# Patient Record
Sex: FEMALE | Race: WHITE | NOT HISPANIC OR LATINO | Employment: UNEMPLOYED | URBAN - METROPOLITAN AREA
[De-identification: names, ages, dates, MRNs, and addresses within clinical notes are randomized per-mention and may not be internally consistent; named-entity substitution may affect disease eponyms.]

---

## 2017-06-22 ENCOUNTER — TELEPHONE (OUTPATIENT)
Dept: GENETICS | Facility: HOSPITAL | Age: 47
End: 2017-06-22

## 2017-08-22 ENCOUNTER — LAB (OUTPATIENT)
Dept: LAB | Facility: HOSPITAL | Age: 47
End: 2017-08-22

## 2017-08-22 DIAGNOSIS — Z80.3 FAMILY HISTORY OF BREAST CANCER: Primary | ICD-10-CM

## 2017-09-19 ENCOUNTER — DOCUMENTATION (OUTPATIENT)
Dept: GENETICS | Facility: HOSPITAL | Age: 47
End: 2017-09-19

## 2020-10-02 RX ORDER — QUETIAPINE FUMARATE 100 MG/1
100 TABLET, FILM COATED ORAL NIGHTLY PRN
COMMUNITY

## 2020-10-02 RX ORDER — DIAZEPAM 5 MG/1
5 TABLET ORAL
COMMUNITY

## 2020-10-02 RX ORDER — DIVALPROEX SODIUM 500 MG/1
500 TABLET, DELAYED RELEASE ORAL 2 TIMES DAILY
COMMUNITY
End: 2022-12-26

## 2020-10-02 RX ORDER — MONTELUKAST SODIUM 4 MG/500MG
4 GRANULE ORAL DAILY
COMMUNITY
End: 2021-04-09 | Stop reason: SDUPTHER

## 2020-11-24 ENCOUNTER — TELEPHONE (OUTPATIENT)
Dept: URGENT CARE | Facility: CLINIC | Age: 50
End: 2020-11-24

## 2020-11-24 DIAGNOSIS — J20.9 ACUTE BRONCHITIS, UNSPECIFIED ORGANISM: Primary | ICD-10-CM

## 2020-11-24 RX ORDER — PREDNISOLONE SODIUM PHOSPHATE 15 MG/5ML
15 SOLUTION ORAL DAILY
Qty: 25 ML | Refills: 0 | Status: SHIPPED | OUTPATIENT
Start: 2020-11-24 | End: 2020-12-14

## 2020-12-14 ENCOUNTER — OFFICE VISIT (OUTPATIENT)
Dept: GASTROENTEROLOGY | Facility: CLINIC | Age: 50
End: 2020-12-14

## 2020-12-14 VITALS — RESPIRATION RATE: 16 BRPM | TEMPERATURE: 96.9 F | BODY MASS INDEX: 24.73 KG/M2 | HEIGHT: 61 IN | WEIGHT: 131 LBS

## 2020-12-14 DIAGNOSIS — Z01.818 PREOP TESTING: Primary | ICD-10-CM

## 2020-12-14 DIAGNOSIS — K59.00 CONSTIPATION, UNSPECIFIED CONSTIPATION TYPE: Chronic | ICD-10-CM

## 2020-12-14 DIAGNOSIS — Z12.11 ENCOUNTER FOR SCREENING FOR MALIGNANT NEOPLASM OF COLON: ICD-10-CM

## 2020-12-14 DIAGNOSIS — R19.4 CHANGE IN BOWEL HABITS: Primary | Chronic | ICD-10-CM

## 2020-12-14 PROCEDURE — 99204 OFFICE O/P NEW MOD 45 MIN: CPT | Performed by: NURSE PRACTITIONER

## 2020-12-14 RX ORDER — MAGNESIUM CARB/ALUMINUM HYDROX 105-160MG
TABLET,CHEWABLE ORAL
Qty: 2 BOTTLE | Refills: 0 | Status: SHIPPED | OUTPATIENT
Start: 2020-12-14 | End: 2021-04-09

## 2020-12-14 RX ORDER — POLYETHYLENE GLYCOL 1450
POWDER (GRAM) MISCELLANEOUS
Qty: 500 G | Refills: 3 | OUTPATIENT
Start: 2020-12-14 | End: 2022-12-26

## 2020-12-14 RX ORDER — POLYETHYLENE GLYCOL 1450
POWDER (GRAM) MISCELLANEOUS
Qty: 238 G | Refills: 0 | Status: SHIPPED | OUTPATIENT
Start: 2020-12-14 | End: 2021-04-09 | Stop reason: SDUPTHER

## 2020-12-14 RX ORDER — BISACODYL 5 MG/1
TABLET, DELAYED RELEASE ORAL
Qty: 4 TABLET | Refills: 0 | OUTPATIENT
Start: 2020-12-14 | End: 2022-12-26

## 2020-12-14 RX ORDER — SODIUM CHLORIDE 9 MG/ML
70 INJECTION, SOLUTION INTRAVENOUS CONTINUOUS PRN
Status: CANCELLED | OUTPATIENT
Start: 2020-12-14

## 2020-12-14 NOTE — PROGRESS NOTES
New Patient Consult      Date: 2020   Patient Name: Aubrie Meneses  MRN: [id number]  : 1970     Primary Care Provider: Megan Dahl MD    Chief Complaint   Patient presents with   • Constipation     History of Present Illness: Aubrie Meneses is a 50 y.o. female who is here today to establish care with Gastroenterology for constipation.     The patient has been having constipation for the past 3-4 months. This is a change in bowel habits. She can go up to 2 weeks without a bowel movement. She has been trying to drink plenty of water and apple juice without much improvement. She had taken 1 packet of Miralax for about 4-5 days, then she had a bowel movement, but the patient's mother does not remember how long she took it. She is currently not taking anything for constipation. No history of rectal bleeding.     Per mother, the patient does not complain of abdominal pain, nausea or reflux. There is no history of difficulty swallowing. The patient has not had colonoscopy or EGD in the past. There is no family history of GI malignancy.    The patient was seen along with her mother.     Subjective      Past Medical History:   Diagnosis Date   • Anxiety    • Mentally challenged    • Mood disorder (CMS/HCC)      History reviewed. No pertinent surgical history.  Family History   Problem Relation Age of Onset   • Colon polyps Mother    • Colon cancer Neg Hx      Social History     Socioeconomic History   • Marital status: Single     Spouse name: Not on file   • Number of children: Not on file   • Years of education: Not on file   • Highest education level: Not on file   Tobacco Use   • Smoking status: Never Smoker   • Smokeless tobacco: Never Used   Substance and Sexual Activity   • Alcohol use: Never     Frequency: Never   • Drug use: Never   • Sexual activity: Defer       Current Outpatient Medications:   •  divalproex (DEPAKOTE) 500 MG DR tablet, Take 500 mg by mouth 2 (Two) Times a Day., Disp: ,  Rfl:   •  montelukast (SINGULAIR) 4 MG pack, Take 4 mg by mouth Daily., Disp: , Rfl:   •  Polyethylene Glycol 3350 (MIRALAX PO), Take  by mouth., Disp: , Rfl:   •  QUEtiapine (SEROquel) 100 MG tablet, Take 100 mg by mouth Every Night., Disp: , Rfl:   •  sertraline (ZOLOFT) 50 MG tablet, Take 50 mg by mouth Daily., Disp: , Rfl:   •  bisacodyl (DULCOLAX) 5 MG EC tablet, Take as directed for colon prep, Disp: 4 tablet, Rfl: 0  •  diazePAM (VALIUM) 5 MG tablet, Take 5 mg by mouth., Disp: , Rfl:   •  magnesium citrate 1.745 GM/30ML solution solution, Use as directed for colonoscopy prep., Disp: 2 bottle, Rfl: 0  •  Polyethylene Glycol powder, Take 1 cap full (17 grams) in 8 oz of noncarbonated beverage and drink once daily, Disp: 500 g, Rfl: 3  •  Polyethylene Glycol powder, Take as directed for colon prep., Disp: 238 g, Rfl: 0    No Known Allergies     Review of Systems   Constitutional: Negative for appetite change and unexpected weight loss.   HENT: Negative for trouble swallowing.    Eyes: Negative for blurred vision.   Respiratory: Negative for choking and chest tightness.    Cardiovascular: Negative for leg swelling.   Gastrointestinal: Positive for constipation. Negative for abdominal distention, abdominal pain, anal bleeding, blood in stool, diarrhea, nausea, rectal pain, vomiting, GERD and indigestion.   Endocrine: Negative for polyphagia.   Genitourinary: Negative for hematuria.   Musculoskeletal: Negative for arthralgias and myalgias.   Skin: Negative for rash.   Allergic/Immunologic: Negative for food allergies.   Neurological: Negative for dizziness and syncope.   Hematological: Does not bruise/bleed easily.   Psychiatric/Behavioral: Negative for depressed mood.      The following portions of the patient's history were reviewed and updated as appropriate: allergies, current medications, past family history, past medical history, past social history, past surgical history and problem list.    Objective      Physical Exam  Vitals signs and nursing note reviewed.   Constitutional:       General: She is awake. She is not in acute distress.     Appearance: Normal appearance. She is well-developed. She is not diaphoretic.   HENT:      Head: Normocephalic and atraumatic.      Right Ear: Hearing and external ear normal.      Left Ear: Hearing and external ear normal.      Nose: Nose normal.      Mouth/Throat:      Lips: Pink.      Mouth: Mucous membranes are not pale, not dry and not cyanotic. No oral lesions.      Pharynx: No oropharyngeal exudate.   Eyes:      General: Lids are normal.         Right eye: No discharge.         Left eye: No discharge.      Conjunctiva/sclera: Conjunctivae normal.   Neck:      Musculoskeletal: Neck supple. No edema.      Thyroid: No thyroid mass or thyromegaly.      Vascular: No JVD.      Trachea: Trachea normal.   Cardiovascular:      Rate and Rhythm: Normal rate and regular rhythm.      Heart sounds: Normal heart sounds and S2 normal. No murmur. No friction rub. No gallop. No S3 sounds.    Pulmonary:      Effort: Pulmonary effort is normal. No respiratory distress.      Breath sounds: Normal breath sounds.   Chest:      Chest wall: No tenderness.   Abdominal:      General: Bowel sounds are normal. There is no distension.      Palpations: Abdomen is not rigid. There is no hepatomegaly, splenomegaly or mass.      Tenderness: There is no abdominal tenderness. There is no guarding or rebound.      Hernia: No hernia is present.   Musculoskeletal: Normal range of motion.   Lymphadenopathy:      Cervical: No cervical adenopathy.      Upper Body:      Left upper body: No supraclavicular adenopathy.   Skin:     General: Skin is warm and dry.      Coloration: Skin is not pale.      Findings: No rash.      Nails: There is no clubbing.     Neurological:      Mental Status: She is alert. Mental status is at baseline.      Coordination: Coordination abnormal.      Gait: Gait abnormal.   Psychiatric:    "      Mood and Affect: Mood is anxious.         Speech: Speech is slurred (difficult to understand at times).         Behavior: Behavior is cooperative.         Cognition and Memory: Cognition is impaired.       Vital Signs:   Vitals:    12/14/20 1424   BP: Comment: pt is mentally challenged, started to cry and fuss   Resp: 16   Temp: 96.9 °F (36.1 °C)   Weight: 59.4 kg (131 lb)   Height: 154.9 cm (61\")     Results Review:   I have reviewed the patient's new clinical and imaging results.    Admission on 11/22/2020, Discharged on 11/22/2020   Component Date Value Ref Range Status   • SARS-CoV-2, REINALDO 11/22/2020 Not Detected  Not Detected Final    Comment: This nucleic acid amplification test was developed and its performance  characteristics determined by [employer]. Nucleic acid  amplification tests include PCR and TMA. This test has not been FDA  cleared or approved. This test has been authorized by FDA under an  Emergency Use Authorization (EUA). This test is only authorized for  the duration of time the declaration that circumstances exist  justifying the authorization of the emergency use of in vitro  diagnostic tests for detection of SARS-CoV-2 virus and/or diagnosis  of COVID-19 infection under section 564(b)(1) of the Act, 21 U.S.C.  360bbb-3(b) (1), unless the authorization is terminated or revoked  sooner.  When diagnostic testing is negative, the possibility of a false  negative result should be considered in the context of a patient's  recent exposures and the presence of clinical signs and symptoms  consistent with COVID-19. An individual without symptoms of COVID-19  and who is not shedding SARS-CoV-2 virus would                            expect to have a  negative (not detected) result in this assay.      No radiology results for the last 90 days.     Dated 8/21/2020 albumin 4.4 total bilirubin 0.3 alkaline phosphatase 73 AST 24 ALT 20 hemoglobin 14.7 hematocrit 45.0 platelet count 326 TSH " 3.040    Assessment / Plan      1. Change in bowel habits  2. Constipation, unspecified constipation type  3. Encounter for screening for malignant neoplasm of colon  The patient has been having constipation for the past few months, this is described as a change in bowel habits. The patient's mother denies history of rectal bleeding. The patient does not complain of abdominal pain. She has taken Miralax in the past with some improvement, but she did not take it daily, only until she had a bowel movement. Recent labs unremarkable, TSH normal. The patient has special needs and is accompanied by her mother.  High fiber, low fat diet with liberal water intake.   Miralax 17 grams daily.  Colonoscopy for screening as she has not had a colonoscopy in the past. There is no family history of colon cancer.    - Case Request; Standing  - Implement Anesthesia Orders Day of Procedure; Standing  - Obtain Informed Consent; Standing  - Verify Bowel Prep Was Successful; Standing  - Oxygen Therapy- Nasal Cannula; 2 LPM; Titrate for SPO2: equal to or greater than, 90%; Standing  - sodium chloride 0.9 % infusion  - Case Request  - Polyethylene Glycol powder; Take 1 cap full (17 grams) in 8 oz of noncarbonated beverage and drink once daily  Dispense: 500 g; Refill: 3  - bisacodyl (DULCOLAX) 5 MG EC tablet; Take as directed for colon prep  Dispense: 4 tablet; Refill: 0  - Polyethylene Glycol powder; Take as directed for colon prep.  Dispense: 238 g; Refill: 0  - magnesium citrate 1.745 GM/30ML solution solution; Use as directed for colonoscopy prep.  Dispense: 2 bottle; Refill: 0    Patient Instructions   1. High fiber, low fat diet with liberal water intake.   2. Miralax 17 grams in 8 ounces of noncarbonated beverage daily.   3. Colonoscopy: Description of the procedure, risks, benefits, alternatives and options, including nonoperative options, were discussed with the patient in detail. The patient's mother understands and wishes to  proceed.  4. COVID-19 testing prior to procedure. The patient will need to self-quarantine after testing until the procedure. Instructions given to patient's mother.    The patient was seen along with her mother.      Zach Aleman, APRN  12/14/2020    Please note that portions of this note may have been completed with a voice recognition program. Efforts were made to edit the dictations, but occasionally words are mistranscribed.

## 2020-12-14 NOTE — PATIENT INSTRUCTIONS
1. High fiber, low fat diet with liberal water intake.   2. Miralax 17 grams in 8 ounces of noncarbonated beverage daily.   3. Colonoscopy: Description of the procedure, risks, benefits, alternatives and options, including nonoperative options, were discussed with the patient in detail. The patient's mother understands and wishes to proceed.  4. COVID-19 testing prior to procedure. The patient will need to self-quarantine after testing until the procedure. Instructions given to patient's mother.    The patient was seen along with her mother.

## 2020-12-15 ENCOUNTER — TELEPHONE (OUTPATIENT)
Dept: GASTROENTEROLOGY | Facility: CLINIC | Age: 50
End: 2020-12-15

## 2020-12-15 NOTE — TELEPHONE ENCOUNTER
Patients Mom has concern because of her daughter is a special needs person, she is asking about a one dose pill that is taken instead of the usual prep-she knows Meryl will not take -, if there is a pill, she will pay out of pocket if needed, but to make sure we get a cost for her. she wants to make sure that \[facility]\"" knows about Meryl, , do not use any trainees on her,

## 2020-12-16 DIAGNOSIS — R19.4 CHANGE IN BOWEL HABITS: Primary | ICD-10-CM

## 2020-12-16 RX ORDER — SODIUM, POTASSIUM,MAG SULFATES 17.5-3.13G
SOLUTION, RECONSTITUTED, ORAL ORAL
Qty: 2 BOTTLE | Refills: 0 | COMMUNITY
Start: 2020-12-16 | End: 2022-12-26

## 2020-12-16 NOTE — TELEPHONE ENCOUNTER
Let her know we are not aware of a one dose pill for colon prep. There is a prep that has 32 pills, but they are very large, and people can have a difficult time with it if they have trouble swallowing large pills. She would take 4 pills with 8 ounces of water every 15 minutes x 4 doses, then depending on time of procedure, it is another 4 pills with 8 ounces of water every 15 minutes x 4. If she thinks she can take this, we would have to get her the instructions.  They are coming out with a new prep in 2021 that is in pill form, it will have several pills to take, but we do not have that information yet as it is not available yet and not sure when it will be. When pre-op calls her a few days before her procedure, let them know about her special needs.

## 2020-12-16 NOTE — TELEPHONE ENCOUNTER
Can you call her mom back and see if she would do the Suprep? It is less to drink and is supposed to taste better..... If she will, her mom can stop by and  a sample. That may be easier for her to tolerate.

## 2021-01-21 ENCOUNTER — LAB (OUTPATIENT)
Dept: LAB | Facility: HOSPITAL | Age: 51
End: 2021-01-21

## 2021-01-21 DIAGNOSIS — Z01.818 PREOP TESTING: ICD-10-CM

## 2021-01-21 PROCEDURE — C9803 HOPD COVID-19 SPEC COLLECT: HCPCS

## 2021-01-21 PROCEDURE — U0004 COV-19 TEST NON-CDC HGH THRU: HCPCS

## 2021-01-22 LAB — SARS-COV-2 RNA RESP QL NAA+PROBE: NOT DETECTED

## 2021-04-09 ENCOUNTER — OFFICE VISIT (OUTPATIENT)
Dept: INTERNAL MEDICINE | Facility: CLINIC | Age: 51
End: 2021-04-09

## 2021-04-09 VITALS
WEIGHT: 138.8 LBS | TEMPERATURE: 97.1 F | BODY MASS INDEX: 26.21 KG/M2 | DIASTOLIC BLOOD PRESSURE: 68 MMHG | HEART RATE: 79 BPM | HEIGHT: 61 IN | OXYGEN SATURATION: 97 % | SYSTOLIC BLOOD PRESSURE: 124 MMHG

## 2021-04-09 DIAGNOSIS — Z12.31 ENCOUNTER FOR SCREENING MAMMOGRAM FOR MALIGNANT NEOPLASM OF BREAST: Primary | ICD-10-CM

## 2021-04-09 DIAGNOSIS — Z12.11 ENCOUNTER FOR SCREENING FOR MALIGNANT NEOPLASM OF COLON: ICD-10-CM

## 2021-04-09 DIAGNOSIS — F39 MOOD DISORDER (HCC): ICD-10-CM

## 2021-04-09 DIAGNOSIS — F81.9 MENTAL DEVELOPMENTAL DELAY: ICD-10-CM

## 2021-04-09 DIAGNOSIS — K59.00 CONSTIPATION, UNSPECIFIED CONSTIPATION TYPE: ICD-10-CM

## 2021-04-09 DIAGNOSIS — F41.9 ANXIETY: ICD-10-CM

## 2021-04-09 DIAGNOSIS — R29.898 WEAKNESS OF BOTH LOWER EXTREMITIES: ICD-10-CM

## 2021-04-09 PROCEDURE — 99214 OFFICE O/P EST MOD 30 MIN: CPT | Performed by: FAMILY MEDICINE

## 2021-04-09 RX ORDER — MONTELUKAST SODIUM 4 MG/500MG
4 GRANULE ORAL DAILY
Qty: 90 EACH | Refills: 3 | OUTPATIENT
Start: 2021-04-09 | End: 2022-12-26

## 2021-04-09 NOTE — PROGRESS NOTES
Aubrie Meneses is a 50 y.o. female.    Chief Complaint   Patient presents with   • Anxiety   • Insomnia       HPI   Patient presents today with mother and stepfather to establish care.  Patient does have mental delay.  Mother reports anxiety and mood is improved with zoloft and depakote.  Mom gives seorquel as needed for sleep. Valium is strictly as needed if she has a procedure, dental exam, blood draw, etc.     Mother reports patient is falling more.  Unstable on feet.  She is weak in her legs. She has done PT in the past.  Mother reports that she was told by previous providers that the patient would never walk or talk.  She is walking, but does require some assistance.  Patient is able to talk and express herself.    Patient does struggle with constipation at times.  This is alleviated with MiraLAX.  She has seen GI for this.  Mother would prefer that the patient do Cologuard rather than a colonoscopy.     The following portions of the patient's history were reviewed and updated as appropriate: allergies, current medications, past family history, past medical history, past social history, past surgical history and problem list.     Past Medical History:   Diagnosis Date   • Abnormal gait    • Anxiety    • At high risk for injury related to fall    • Constipation    • Deficit in communication due to slurred speech    • Dysphagia     tries to eat and drink too fast and aspirates easy   • Mentally challenged    • Mood disorder (CMS/HCC)    • Special needs due to learning disability     mother Virginia assists and is guardian       Past Surgical History:   Procedure Laterality Date   • TOOTH EXTRACTION         Family History   Problem Relation Age of Onset   • Colon polyps Mother    • Colon cancer Neg Hx        Social History     Socioeconomic History   • Marital status: Single     Spouse name: Not on file   • Number of children: Not on file   • Years of education: Not on file   • Highest education level: Not on  file   Tobacco Use   • Smoking status: Never Smoker   • Smokeless tobacco: Never Used   Vaping Use   • Vaping Use: Never used   Substance and Sexual Activity   • Alcohol use: Never   • Drug use: Never   • Sexual activity: Defer       No Known Allergies      Current Outpatient Medications:   •  bisacodyl (DULCOLAX) 5 MG EC tablet, Take as directed for colon prep, Disp: 4 tablet, Rfl: 0  •  diazePAM (VALIUM) 5 MG tablet, Take 5 mg by mouth. Only for dental procedures and checkups, Disp: , Rfl:   •  divalproex (DEPAKOTE) 500 MG DR tablet, Take 500 mg by mouth 2 (Two) Times a Day., Disp: , Rfl:   •  montelukast (SINGULAIR) 4 MG pack, Take 1 packet by mouth Daily., Disp: 90 each, Rfl: 3  •  Polyethylene Glycol powder, Take 1 cap full (17 grams) in 8 oz of noncarbonated beverage and drink once daily, Disp: 500 g, Rfl: 3  •  QUEtiapine (SEROquel) 100 MG tablet, Take 100 mg by mouth At Night As Needed., Disp: , Rfl:   •  sertraline (ZOLOFT) 50 MG tablet, Take 50 mg by mouth Daily., Disp: , Rfl:   •  sodium-potassium-magnesium sulfates (Suprep Bowel Prep Kit) 17.5-3.13-1.6 GM/177ML solution oral solution, Use as directed for colonoscopy prep. Patient has instructions., Disp: 2 bottle, Rfl: 0    ROS    Review of Systems   Constitutional: Negative for chills, fatigue and fever.   HENT: Negative for congestion, postnasal drip and sore throat.    Eyes: Negative for blurred vision and visual disturbance.   Respiratory: Negative for cough, shortness of breath and wheezing.    Cardiovascular: Negative for chest pain and leg swelling.   Gastrointestinal: Positive for constipation. Negative for abdominal pain, diarrhea, nausea and vomiting.   Endocrine: Negative for cold intolerance and heat intolerance.   Genitourinary: Negative for dysuria and frequency.   Musculoskeletal: Positive for gait problem. Negative for arthralgias and back pain.   Skin: Negative for color change and rash.   Allergic/Immunologic: Negative for environmental  "allergies.   Neurological: Positive for weakness. Negative for numbness and headache.   Hematological: Does not bruise/bleed easily.   Psychiatric/Behavioral: Negative for depressed mood. The patient is not nervous/anxious.        Vitals:    04/09/21 0920   BP: 124/68   BP Location: Left arm   Patient Position: Sitting   Cuff Size: Adult   Pulse: 79   Temp: 97.1 °F (36.2 °C)   TempSrc: Temporal   SpO2: 97%   Weight: 63 kg (138 lb 12.8 oz)   Height: 154.9 cm (61\")     Body mass index is 26.23 kg/m².    Physical Exam     Physical Exam  Constitutional:       General: She is not in acute distress.     Appearance: Normal appearance. She is well-developed.   HENT:      Head: Normocephalic and atraumatic.      Right Ear: External ear normal.      Left Ear: External ear normal.   Eyes:      Extraocular Movements: Extraocular movements intact.      Conjunctiva/sclera: Conjunctivae normal.   Cardiovascular:      Rate and Rhythm: Normal rate and regular rhythm.      Heart sounds: No murmur heard.     Pulmonary:      Effort: Pulmonary effort is normal. No respiratory distress.      Breath sounds: Normal breath sounds. No wheezing.   Abdominal:      General: Bowel sounds are normal. There is no distension.      Palpations: Abdomen is soft.      Tenderness: There is no abdominal tenderness.   Musculoskeletal:      Right lower leg: No edema.      Left lower leg: No edema.      Comments: Weakness to lower legs bilaterally.  Patient unable to bear weight without assistance.   Skin:     General: Skin is warm and dry.   Neurological:      Mental Status: She is alert and oriented to person, place, and time.      Cranial Nerves: No cranial nerve deficit.      Motor: Weakness present.      Comments: Mental delay   Psychiatric:         Attention and Perception: Attention normal.         Mood and Affect: Mood normal.         Speech: Speech is delayed.         Behavior: Behavior normal.         Assessment/Plan    Problems Addressed this " Visit        Gastrointestinal Abdominal     Constipation     Stable with current medication.  Patient may continue receiving MiraLAX as needed.            Mental Health    Mood disorder (CMS/HCC)     Stable on current medication.  Patient is to continue Zoloft and Depakote.         Anxiety     Stable on current medication.  Patient will continue Zoloft and Depakote.  May use as needed Valium on rare occasions.            Neuro    Mental developmental delay     Stable.  Patient is verbal and answers questions.  She is able to express herself as previously mentioned.  Patient does require assistance with walking due to severe weakness in her legs.  They have tried physical therapy in the past.  They do not wish to proceed with physical therapy at this point.           Other Visit Diagnoses     Encounter for screening mammogram for malignant neoplasm of breast    -  Primary    Relevant Orders    Cologuard - Stool, Per Rectum    Encounter for screening for malignant neoplasm of colon         Relevant Orders    Cologuard - Stool, Per Rectum    Weakness of both lower extremities              New Medications Ordered This Visit   Medications   • montelukast (SINGULAIR) 4 MG pack     Sig: Take 1 packet by mouth Daily.     Dispense:  90 each     Refill:  3       No orders of the defined types were placed in this encounter.      Return in about 6 months (around 10/9/2021) for Medicare Wellness.      Arpita Olson DO

## 2021-04-25 PROBLEM — F41.9 ANXIETY: Status: ACTIVE | Noted: 2021-04-25

## 2021-04-25 PROBLEM — F39 MOOD DISORDER: Status: ACTIVE | Noted: 2021-04-25

## 2021-04-25 PROBLEM — F81.9 MENTAL DEVELOPMENTAL DELAY: Status: ACTIVE | Noted: 2021-04-25

## 2021-04-25 NOTE — ASSESSMENT & PLAN NOTE
Stable on current medication.  Patient will continue Zoloft and Depakote.  May use as needed Valium on rare occasions.

## 2021-04-25 NOTE — ASSESSMENT & PLAN NOTE
Stable.  Patient is verbal and answers questions.  She is able to express herself as previously mentioned.  Patient does require assistance with walking due to severe weakness in her legs.  They have tried physical therapy in the past.  They do not wish to proceed with physical therapy at this point.

## 2022-04-19 ENCOUNTER — TELEPHONE (OUTPATIENT)
Dept: INTERNAL MEDICINE | Facility: CLINIC | Age: 52
End: 2022-04-19

## 2022-04-19 NOTE — TELEPHONE ENCOUNTER
Pt did not complete cologuard ordered on 04/10/2021. This order is too old to be used and has been cancelled.

## 2023-05-17 ENCOUNTER — HOSPITAL ENCOUNTER (OUTPATIENT)
Dept: GENERAL RADIOLOGY | Facility: HOSPITAL | Age: 53
Discharge: HOME | End: 2023-05-17
Admitting: FAMILY MEDICINE
Payer: MEDICARE

## 2023-05-17 ENCOUNTER — TRANSCRIBE ORDERS (OUTPATIENT)
Dept: GENERAL RADIOLOGY | Facility: HOSPITAL | Age: 53
End: 2023-05-17
Payer: MEDICARE

## 2023-05-17 DIAGNOSIS — M79.642 PAIN IN LEFT HAND: Primary | ICD-10-CM

## 2023-05-17 DIAGNOSIS — M79.642 PAIN IN LEFT HAND: ICD-10-CM

## 2023-05-17 PROCEDURE — 73130 X-RAY EXAM OF HAND: CPT

## 2023-05-18 ENCOUNTER — OFFICE VISIT (OUTPATIENT)
Dept: ORTHOPEDIC SURGERY | Facility: CLINIC | Age: 53
End: 2023-05-18
Payer: MEDICARE

## 2023-05-18 VITALS
HEIGHT: 61 IN | WEIGHT: 140 LBS | DIASTOLIC BLOOD PRESSURE: 80 MMHG | BODY MASS INDEX: 26.43 KG/M2 | SYSTOLIC BLOOD PRESSURE: 124 MMHG | TEMPERATURE: 97.8 F

## 2023-05-18 DIAGNOSIS — S62.355A CLOSED NONDISPLACED FRACTURE OF SHAFT OF FOURTH METACARPAL BONE OF LEFT HAND, INITIAL ENCOUNTER: ICD-10-CM

## 2023-05-18 DIAGNOSIS — F41.9 ANXIOUSNESS: Primary | ICD-10-CM

## 2023-05-18 DIAGNOSIS — M79.642 LEFT HAND PAIN: ICD-10-CM

## 2023-05-18 DIAGNOSIS — S62.323A CLOSED DISPLACED FRACTURE OF SHAFT OF THIRD METACARPAL BONE OF LEFT HAND, INITIAL ENCOUNTER: Primary | ICD-10-CM

## 2023-05-18 RX ORDER — DIAZEPAM 5 MG/1
TABLET ORAL
Qty: 2 TABLET | Refills: 0 | Status: SHIPPED | OUTPATIENT
Start: 2023-05-18

## 2023-05-18 NOTE — PROGRESS NOTES
Office Note     Name: Aubrie Meneses    : 1970     MRN: [id number]     Chief Complaint  Pain and Fracture of the Left Hand (Referred by Megan Dahl MD for left hand fracture. Her mom states she fell coming into the house from the porch, injured left hand on 23. Mom has been placing ice packs on her hand. )    Subjective     History of Present Illness:  Aubrie Meneses is a 52 y.o. female presents with mom today for left hand 3rd and possibly 4th metacarpal fracture that occurred last weekend.  She was seen by Dr. Dahl and diagnosed and then sent here for referral.  Her mother already had a wrist splint at home which the patient has been wearing intermittently.  However mother states that the patient has poor compliance with the splint as she is frequently taking it on and off.    Review of Systems   Constitutional: Negative for fever.   HENT: Negative for dental problem and voice change.    Eyes: Negative for visual disturbance.   Respiratory: Negative for shortness of breath.    Cardiovascular: Negative for chest pain.   Gastrointestinal: Negative for abdominal pain.   Genitourinary: Negative for dysuria.   Musculoskeletal: Positive for arthralgias. Negative for gait problem and joint swelling.   Skin: Positive for color change (bruising left hand). Negative for rash.   Neurological: Negative for speech difficulty.   Hematological: Does not bruise/bleed easily.   Psychiatric/Behavioral: Negative for confusion.        Past Medical History:   Diagnosis Date   • Abnormal gait    • Anxiety    • At high risk for injury related to fall    • Constipation    • Deficit in communication due to slurred speech    • Dysphagia     tries to eat and drink too fast and aspirates easy   • Mentally challenged    • Mood disorder    • Special needs due to learning disability     mother Virginia assists and is guardian        Past Surgical History:   Procedure Laterality Date   • TOOTH EXTRACTION         Family  "History   Problem Relation Age of Onset   • Colon polyps Mother    • Colon cancer Neg Hx        Social History     Socioeconomic History   • Marital status: Single   Tobacco Use   • Smoking status: Never   • Smokeless tobacco: Never   Vaping Use   • Vaping Use: Never used   Substance and Sexual Activity   • Alcohol use: Never   • Drug use: Never   • Sexual activity: Defer         Current Outpatient Medications:   •  diazePAM (VALIUM) 5 MG tablet, Take 1 tablet by mouth. Only for dental procedures and checkups, Disp: , Rfl:   •  QUEtiapine (SEROquel) 100 MG tablet, Take 1 tablet by mouth At Night As Needed., Disp: , Rfl:   •  sertraline (ZOLOFT) 50 MG tablet, Take 1 tablet by mouth Daily., Disp: , Rfl:   •  diazePAM (Valium) 5 MG tablet, Take 2 tablets approximately 20 minutes prior to procedure once for procedural anxiety., Disp: 2 tablet, Rfl: 0    No Known Allergies        Objective   /80   Temp 97.8 °F (36.6 °C)   Ht 154.9 cm (61\")   Wt 63.5 kg (140 lb)   BMI 26.45 kg/m²    BMI is >= 25 and <30. (Overweight) The following options were offered after discussion;: weight loss educational material (shared in after visit summary)       Physical Exam  Left Hand Exam     Tenderness   The patient is experiencing tenderness in the dorsal area.     Range of Motion   The patient has normal left wrist ROM.    Comments:  Left hand:  There is mild bruising over the dorsal area of the left hand as well as mild swelling throughout the dorsum.  She has normal cascade of motion and there is no significant rotational or angular deformity of the third and fourth digits.  Radial and ulnar pulses are 2+, cap refill is brisk to each digit.  Gross sensation appears to be intact.    Exam was somewhat limited due to the patient being agitated and her status as special needs.           Extremity DVT signs are negative by clinical screen. and negative on physical exam with negative Maile sign, no calf pain, no palpable cords and " no skin tone change     Independent Review of Radiographic Studies:    Reviewed relevant prior imaging with patient again today.  Reviewed images and agree with radiologist interpretation.   Closed, minimally displaced fracture of the proximal 3rd metacarpal shaft as well as questionable fracture of the proximal 4th metacarpal shaft.      Procedures    Assessment and Plan   Diagnoses and all orders for this visit:    1. Closed displaced fracture of shaft of third metacarpal bone of left hand, initial encounter (Primary)    2. Left hand pain    3. Closed nondisplaced fracture of shaft of fourth metacarpal bone of left hand, initial encounter       Reference materials relating to condition and treatment provided to the patient  Orthopedic activities reviewed and patient expressed appreciation  Discussion of orthopedic goals  Risk, benefits, and merits of treatment alternatives reviewed with the patient and questions answered  Use brace as instructed  Elevate hand for residual swelling  Ice, heat, and/or modalities as beneficial    Recommendations/Plan:  Exercise, medications, injections, other patient advice, and return appointment as noted.  Patient is encouraged to call or return for any issues or concerns.  Attempted to apply a short arm cast to the patient's left arm however we were unsuccessful due to the patient's agitation and status as a special needs person.  The mother is requesting a 2, 5mg Valium to be sent in to give the patient prior to her procedure so that she is more calm and so the cast can be applied.  I sent a refill request to Dr. Perrin or rivka for approval.  Patient is to wear her splint until her follow-up with me tomorrow when we will attempt to apply the cast.    No follow-ups on file.  Patient agreeable to call or return sooner for any concerns.

## 2023-05-19 ENCOUNTER — DOCUMENTATION (OUTPATIENT)
Dept: ORTHOPEDIC SURGERY | Facility: CLINIC | Age: 53
End: 2023-05-19
Payer: MEDICARE

## 2023-05-19 NOTE — PROGRESS NOTES
"Thumb-o-prene brace was placed on left hand. This was then wrapped with 3\" ACE wrap.  Thumb spica part of the brace was cut by Dr. Oneil to allow thumb movement. All edges of ACE wrap were taped with clear surgical tape to deter removal by patient. Patient tolerated this well, and will follow up at scheduled appointment. If any questions or concerns arise before her appointment, her parents were instructed to call the office.   "

## 2023-10-31 DIAGNOSIS — M79.671 RIGHT FOOT PAIN: Primary | ICD-10-CM

## 2023-11-01 ENCOUNTER — OFFICE VISIT (OUTPATIENT)
Dept: ORTHOPEDIC SURGERY | Facility: CLINIC | Age: 53
End: 2023-11-01
Payer: MEDICARE

## 2023-11-01 VITALS — TEMPERATURE: 98 F | BODY MASS INDEX: 26.43 KG/M2 | WEIGHT: 140 LBS | HEIGHT: 61 IN

## 2023-11-01 DIAGNOSIS — S93.601A FOOT SPRAIN, RIGHT, INITIAL ENCOUNTER: ICD-10-CM

## 2023-11-01 DIAGNOSIS — S92.311A DISPLACED FRACTURE OF FIRST METATARSAL BONE, RIGHT FOOT, INITIAL ENCOUNTER FOR CLOSED FRACTURE: Primary | ICD-10-CM

## 2023-11-01 NOTE — PROGRESS NOTES
Office Note     Name: Aubrie Meneses    : 1970     MRN: [id number]     Chief Complaint  Pain of the Right Foot (Reports falling at home on 10/30/23. )    Subjective     History of Present Illness:  Aubrie Meneses is a 53 y.o. female presenting today for right foot pain and swelling.  She presents with her guardian.  Patient is largely non-verbal and unable to provide history.  According to guardian she fell on 10/30/23 and has been complaining of right foot pain at the base of the 1st metatarsal.  There is also swelling and mild bruising.  Guardian relays that she is having difficulty walking and states that she has pain in her right foot when bearing weight.  Guardian relays history of right foot injury in  that seemingly healed without sequela.      Review of Systems   Musculoskeletal:  Positive for arthralgias (right foot) and joint swelling (right foot).        Past Medical History:   Diagnosis Date    Abnormal gait     Anxiety     At high risk for injury related to fall     Constipation     Deficit in communication due to slurred speech     Dysphagia     tries to eat and drink too fast and aspirates easy    Mentally challenged     Mood disorder     Special needs due to learning disability     mother Virginia assists and is guardian        Past Surgical History:   Procedure Laterality Date    TOOTH EXTRACTION         Family History   Problem Relation Age of Onset    Colon polyps Mother     Colon cancer Neg Hx        Social History     Socioeconomic History    Marital status: Single   Tobacco Use    Smoking status: Never    Smokeless tobacco: Never   Vaping Use    Vaping Use: Never used   Substance and Sexual Activity    Alcohol use: Never    Drug use: Never    Sexual activity: Defer         Current Outpatient Medications:     diazePAM (Valium) 5 MG tablet, Take 2 tablets approximately 20 minutes prior to procedure once for procedural anxiety., Disp: 2 tablet, Rfl: 0    QUEtiapine (SEROquel)  "100 MG tablet, Take 1 tablet by mouth At Night As Needed., Disp: , Rfl:     sertraline (ZOLOFT) 50 MG tablet, Take 1 tablet by mouth Daily., Disp: , Rfl:     No Known Allergies        Objective   Temp 98 °F (36.7 °C)   Ht 155 cm (61.02\")   Wt 63.5 kg (140 lb)   BMI 26.44 kg/m²            Physical Exam  Right Ankle Exam     Tenderness   Right ankle tenderness location: tenderness over lateral and anterior base of 1st metatarsal.  Swelling: mild    Range of Motion   The patient has normal right ankle ROM.    Tests   Anterior drawer: negative  Varus tilt: negative    Other   Erythema: absent  Sensation: normal  Pulse: present     Comments:  Slight bruising noted at dorsal base of toes.             Extremity DVT signs are negative by clinical screen.     Independent Review of Radiographic Studies:    Three-view plain films of the right foot were done in office today for the evaluation of pain, comparison views not available and independently reviewed by me.  There is a tiny avulsion fracture of the lateral aspect of the base of the first metatarsal most easily noted on oblique view.  No other acute osseous abnormalities were noted.    Procedures    Assessment and Plan   Diagnoses and all orders for this visit:    1. Displaced fracture of first metatarsal bone, right foot, initial encounter for closed fracture (Primary)    2. Foot sprain, right, initial encounter       Regular exercise as tolerated  Orthopedic activities reviewed and patient expressed appreciation  Discussion of orthopedic goals  Risk, benefits, and merits of treatment alternatives reviewed with the patient and questions answered  Use brace as instructed  Elevate foot for residual swelling  Reduced physical activity as appropriate  Weight bearing parameters reviewed  Ice, heat, and/or modalities as beneficial  Guided on proper techniques for mobility, strength, agility and/or conditioning exercises    Recommendations/Plan:  Exercise, medications, " injections, other patient advice, and return appointment as noted.  Brace: Fracture shoe.  Patient is encouraged to call or return for any issues or concerns.    Patient was placed in a fracture shoe and advised that she may weight-bear as tolerated.  Advised over-the-counter analgesics and ice and heat to the area as well as an Ace wrap to provide compression to reduce pain and swelling.  Recheck scheduled for 2 weeks we will repeat x-rays at that time advised patient/guardian to call or return office for any concerns.  If patient continues to have significant pain consider CT of the foot for further evaluation.    Return in about 2 weeks (around 11/15/2023) for XOA.  Patient agreeable to call or return sooner for any concerns.

## 2024-06-13 ENCOUNTER — TRANSCRIBE ORDERS (OUTPATIENT)
Dept: ADMINISTRATIVE | Facility: HOSPITAL | Age: 54
End: 2024-06-13
Payer: MEDICARE

## 2024-06-13 DIAGNOSIS — R10.13 ABDOMINAL PAIN, EPIGASTRIC: Primary | ICD-10-CM

## 2024-07-17 ENCOUNTER — LAB (OUTPATIENT)
Dept: LAB | Facility: HOSPITAL | Age: 54
End: 2024-07-17
Payer: MEDICARE

## 2024-07-17 ENCOUNTER — TRANSCRIBE ORDERS (OUTPATIENT)
Dept: LAB | Facility: HOSPITAL | Age: 54
End: 2024-07-17
Payer: MEDICARE

## 2024-07-17 DIAGNOSIS — R10.9 STOMACH ACHE: Primary | ICD-10-CM

## 2024-07-17 DIAGNOSIS — R10.9 STOMACH ACHE: ICD-10-CM

## 2024-07-17 LAB
ALBUMIN SERPL-MCNC: 4.5 G/DL (ref 3.5–5.2)
ALBUMIN/GLOB SERPL: 1.3 G/DL
ALP SERPL-CCNC: 101 U/L (ref 39–117)
ALT SERPL W P-5'-P-CCNC: 12 U/L (ref 1–33)
AMYLASE SERPL-CCNC: 61 U/L (ref 28–100)
ANION GAP SERPL CALCULATED.3IONS-SCNC: 11 MMOL/L (ref 5–15)
AST SERPL-CCNC: 22 U/L (ref 1–32)
BASOPHILS # BLD AUTO: 0.07 10*3/MM3 (ref 0–0.2)
BASOPHILS NFR BLD AUTO: 0.7 % (ref 0–1.5)
BILIRUB SERPL-MCNC: 0.8 MG/DL (ref 0–1.2)
BUN SERPL-MCNC: 18 MG/DL (ref 6–20)
BUN/CREAT SERPL: 27.3 (ref 7–25)
CALCIUM SPEC-SCNC: 10 MG/DL (ref 8.6–10.5)
CHLORIDE SERPL-SCNC: 108 MMOL/L (ref 98–107)
CHOLEST SERPL-MCNC: 325 MG/DL (ref 0–200)
CO2 SERPL-SCNC: 21 MMOL/L (ref 22–29)
CREAT SERPL-MCNC: 0.66 MG/DL (ref 0.57–1)
DEPRECATED RDW RBC AUTO: 43 FL (ref 37–54)
EGFRCR SERPLBLD CKD-EPI 2021: 105 ML/MIN/1.73
EOSINOPHIL # BLD AUTO: 0.12 10*3/MM3 (ref 0–0.4)
EOSINOPHIL NFR BLD AUTO: 1.2 % (ref 0.3–6.2)
ERYTHROCYTE [DISTWIDTH] IN BLOOD BY AUTOMATED COUNT: 14 % (ref 12.3–15.4)
GLOBULIN UR ELPH-MCNC: 3.6 GM/DL
GLUCOSE SERPL-MCNC: 104 MG/DL (ref 65–99)
HCT VFR BLD AUTO: 47.7 % (ref 34–46.6)
HDLC SERPL-MCNC: 39 MG/DL (ref 40–60)
HGB BLD-MCNC: 15.7 G/DL (ref 12–15.9)
IMM GRANULOCYTES # BLD AUTO: 0.03 10*3/MM3 (ref 0–0.05)
IMM GRANULOCYTES NFR BLD AUTO: 0.3 % (ref 0–0.5)
LDLC SERPL CALC-MCNC: 262 MG/DL (ref 0–100)
LDLC/HDLC SERPL: 6.67 {RATIO}
LIPASE SERPL-CCNC: 39 U/L (ref 13–60)
LYMPHOCYTES # BLD AUTO: 1.66 10*3/MM3 (ref 0.7–3.1)
LYMPHOCYTES NFR BLD AUTO: 16.5 % (ref 19.6–45.3)
MCH RBC QN AUTO: 28.2 PG (ref 26.6–33)
MCHC RBC AUTO-ENTMCNC: 32.9 G/DL (ref 31.5–35.7)
MCV RBC AUTO: 85.6 FL (ref 79–97)
MONOCYTES # BLD AUTO: 0.66 10*3/MM3 (ref 0.1–0.9)
MONOCYTES NFR BLD AUTO: 6.5 % (ref 5–12)
NEUTROPHILS NFR BLD AUTO: 7.54 10*3/MM3 (ref 1.7–7)
NEUTROPHILS NFR BLD AUTO: 74.8 % (ref 42.7–76)
NRBC BLD AUTO-RTO: 0 /100 WBC (ref 0–0.2)
PLATELET # BLD AUTO: 328 10*3/MM3 (ref 140–450)
PMV BLD AUTO: 11.3 FL (ref 6–12)
POTASSIUM SERPL-SCNC: 3.8 MMOL/L (ref 3.5–5.2)
PROT SERPL-MCNC: 8.1 G/DL (ref 6–8.5)
RBC # BLD AUTO: 5.57 10*6/MM3 (ref 3.77–5.28)
SODIUM SERPL-SCNC: 140 MMOL/L (ref 136–145)
TRIGL SERPL-MCNC: 130 MG/DL (ref 0–150)
TSH SERPL DL<=0.05 MIU/L-ACNC: 1.53 UIU/ML (ref 0.27–4.2)
VLDLC SERPL-MCNC: 24 MG/DL (ref 5–40)
WBC NRBC COR # BLD AUTO: 10.08 10*3/MM3 (ref 3.4–10.8)

## 2024-07-17 PROCEDURE — 84443 ASSAY THYROID STIM HORMONE: CPT

## 2024-07-17 PROCEDURE — 80061 LIPID PANEL: CPT

## 2024-07-17 PROCEDURE — 36415 COLL VENOUS BLD VENIPUNCTURE: CPT

## 2024-07-17 PROCEDURE — 80053 COMPREHEN METABOLIC PANEL: CPT

## 2024-07-17 PROCEDURE — 85025 COMPLETE CBC W/AUTO DIFF WBC: CPT

## 2024-07-17 PROCEDURE — 83690 ASSAY OF LIPASE: CPT

## 2024-07-17 PROCEDURE — 82150 ASSAY OF AMYLASE: CPT

## 2025-08-14 ENCOUNTER — TRANSCRIBE ORDERS (OUTPATIENT)
Dept: LAB | Facility: HOSPITAL | Age: 55
End: 2025-08-14
Payer: MEDICARE

## 2025-08-14 ENCOUNTER — OFFICE VISIT (OUTPATIENT)
Dept: ORTHOPEDIC SURGERY | Facility: CLINIC | Age: 55
End: 2025-08-14
Payer: MEDICARE

## 2025-08-14 ENCOUNTER — LAB (OUTPATIENT)
Dept: LAB | Facility: HOSPITAL | Age: 55
End: 2025-08-14
Payer: MEDICARE

## 2025-08-14 VITALS
SYSTOLIC BLOOD PRESSURE: 128 MMHG | DIASTOLIC BLOOD PRESSURE: 84 MMHG | HEIGHT: 61 IN | BODY MASS INDEX: 24.17 KG/M2 | WEIGHT: 128 LBS

## 2025-08-14 DIAGNOSIS — S42.292A OTHER CLOSED DISPLACED FRACTURE OF PROXIMAL END OF LEFT HUMERUS, INITIAL ENCOUNTER: Primary | ICD-10-CM

## 2025-08-14 DIAGNOSIS — M25.522 ARTHRALGIA OF LEFT ELBOW: ICD-10-CM

## 2025-08-14 DIAGNOSIS — M25.512 ARTHRALGIA OF LEFT SHOULDER REGION: ICD-10-CM

## 2025-08-14 DIAGNOSIS — E78.5 HYPERLIPIDEMIA, UNSPECIFIED HYPERLIPIDEMIA TYPE: Primary | ICD-10-CM

## 2025-08-14 DIAGNOSIS — E78.5 HYPERLIPIDEMIA, UNSPECIFIED HYPERLIPIDEMIA TYPE: ICD-10-CM

## 2025-08-14 LAB
ALBUMIN SERPL-MCNC: 4.2 G/DL (ref 3.5–5.2)
ALBUMIN/GLOB SERPL: 1.3 G/DL
ALP SERPL-CCNC: 123 U/L (ref 39–117)
ALT SERPL W P-5'-P-CCNC: 14 U/L (ref 1–33)
ANION GAP SERPL CALCULATED.3IONS-SCNC: 11.7 MMOL/L (ref 5–15)
AST SERPL-CCNC: 22 U/L (ref 1–32)
BASOPHILS # BLD AUTO: 0.06 10*3/MM3 (ref 0–0.2)
BASOPHILS NFR BLD AUTO: 0.8 % (ref 0–1.5)
BILIRUB SERPL-MCNC: 0.5 MG/DL (ref 0–1.2)
BUN SERPL-MCNC: 12 MG/DL (ref 6–20)
BUN/CREAT SERPL: 17.6 (ref 7–25)
CALCIUM SPEC-SCNC: 9.3 MG/DL (ref 8.6–10.5)
CHLORIDE SERPL-SCNC: 106 MMOL/L (ref 98–107)
CHOLEST SERPL-MCNC: 148 MG/DL (ref 0–200)
CO2 SERPL-SCNC: 25.3 MMOL/L (ref 22–29)
CREAT SERPL-MCNC: 0.68 MG/DL (ref 0.57–1)
DEPRECATED RDW RBC AUTO: 43.6 FL (ref 37–54)
EGFRCR SERPLBLD CKD-EPI 2021: 103.6 ML/MIN/1.73
EOSINOPHIL # BLD AUTO: 0.2 10*3/MM3 (ref 0–0.4)
EOSINOPHIL NFR BLD AUTO: 2.8 % (ref 0.3–6.2)
ERYTHROCYTE [DISTWIDTH] IN BLOOD BY AUTOMATED COUNT: 13.5 % (ref 12.3–15.4)
GLOBULIN UR ELPH-MCNC: 3.3 GM/DL
GLUCOSE SERPL-MCNC: 82 MG/DL (ref 65–99)
HCT VFR BLD AUTO: 42.7 % (ref 34–46.6)
HDLC SERPL-MCNC: 36 MG/DL (ref 40–60)
HGB BLD-MCNC: 13.7 G/DL (ref 12–15.9)
IMM GRANULOCYTES # BLD AUTO: 0.03 10*3/MM3 (ref 0–0.05)
IMM GRANULOCYTES NFR BLD AUTO: 0.4 % (ref 0–0.5)
LDLC SERPL CALC-MCNC: 87 MG/DL (ref 0–100)
LDLC/HDLC SERPL: 2.34 {RATIO}
LYMPHOCYTES # BLD AUTO: 1.91 10*3/MM3 (ref 0.7–3.1)
LYMPHOCYTES NFR BLD AUTO: 26.8 % (ref 19.6–45.3)
MCH RBC QN AUTO: 28.2 PG (ref 26.6–33)
MCHC RBC AUTO-ENTMCNC: 32.1 G/DL (ref 31.5–35.7)
MCV RBC AUTO: 87.9 FL (ref 79–97)
MONOCYTES # BLD AUTO: 0.52 10*3/MM3 (ref 0.1–0.9)
MONOCYTES NFR BLD AUTO: 7.3 % (ref 5–12)
NEUTROPHILS NFR BLD AUTO: 4.4 10*3/MM3 (ref 1.7–7)
NEUTROPHILS NFR BLD AUTO: 61.9 % (ref 42.7–76)
NRBC BLD AUTO-RTO: 0 /100 WBC (ref 0–0.2)
PLATELET # BLD AUTO: 297 10*3/MM3 (ref 140–450)
PMV BLD AUTO: 11.3 FL (ref 6–12)
POTASSIUM SERPL-SCNC: 3.7 MMOL/L (ref 3.5–5.2)
PROT SERPL-MCNC: 7.5 G/DL (ref 6–8.5)
RBC # BLD AUTO: 4.86 10*6/MM3 (ref 3.77–5.28)
SODIUM SERPL-SCNC: 143 MMOL/L (ref 136–145)
TRIGL SERPL-MCNC: 138 MG/DL (ref 0–150)
VLDLC SERPL-MCNC: 25 MG/DL (ref 5–40)
WBC NRBC COR # BLD AUTO: 7.12 10*3/MM3 (ref 3.4–10.8)

## 2025-08-14 PROCEDURE — 80053 COMPREHEN METABOLIC PANEL: CPT

## 2025-08-14 PROCEDURE — 80061 LIPID PANEL: CPT

## 2025-08-14 PROCEDURE — 36415 COLL VENOUS BLD VENIPUNCTURE: CPT

## 2025-08-14 PROCEDURE — 85025 COMPLETE CBC W/AUTO DIFF WBC: CPT

## 2025-08-14 RX ORDER — ROSUVASTATIN CALCIUM 10 MG/1
10 TABLET, COATED ORAL DAILY
COMMUNITY

## 2025-08-14 RX ORDER — SERTRALINE HYDROCHLORIDE 100 MG/1
TABLET, FILM COATED ORAL
COMMUNITY
Start: 2025-07-30

## 2025-08-18 ENCOUNTER — PATIENT ROUNDING (BHMG ONLY) (OUTPATIENT)
Dept: ORTHOPEDIC SURGERY | Facility: CLINIC | Age: 55
End: 2025-08-18
Payer: MEDICARE